# Patient Record
Sex: MALE | Race: WHITE | Employment: OTHER | URBAN - METROPOLITAN AREA
[De-identification: names, ages, dates, MRNs, and addresses within clinical notes are randomized per-mention and may not be internally consistent; named-entity substitution may affect disease eponyms.]

---

## 2018-05-22 PROBLEM — M79.7 FIBROMYALGIA: Status: ACTIVE | Noted: 2017-01-16

## 2020-10-14 PROBLEM — M54.81 BILATERAL OCCIPITAL NEURALGIA: Status: ACTIVE | Noted: 2020-10-14

## 2021-03-08 PROBLEM — M54.50 ACUTE RIGHT-SIDED LOW BACK PAIN WITHOUT SCIATICA: Status: ACTIVE | Noted: 2021-03-08

## 2021-07-20 PROBLEM — G43.111 INTRACTABLE MIGRAINE WITH AURA WITH STATUS MIGRAINOSUS: Status: ACTIVE | Noted: 2021-07-20

## 2021-12-08 ENCOUNTER — TELEPHONE (OUTPATIENT)
Dept: OTHER | Age: 51
End: 2021-12-08

## 2021-12-08 NOTE — TELEPHONE ENCOUNTER
Pt name and  verified. Who is your current or most recent primary care provider and   which hospital were they affiliated with? [facility] [place]    How long did you see them for? forever    Is there a reason that you are looking for a new provider? Establish care    Would you like to provide your e-mail to sign up for out My Chart system? yes  The benefits of being a My Chart member are:    -Request medical appointments  -View your health summary from the My Chart electronic health record. -View test results.  -Request prescription renewals.  -Access trusted health information resources.  -Communicate electronically and securely with your medical care team.    Would you like a test message or letter with your activation code? YES TEXT    Do you have a preference about the provider you will see? Karl Moment    When was your last Annual or Well Child Exam? 2021    Do you have any ongoing or chronic conditions you are currently being treated   for (e.g. Hypertension, Diabetes, Depression, etc)? 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 If so, who? yes    Do you need an appointment to establish care or is there something more   urgent you need to be seen for? Explain: establish    Are you on any medicines that require a special prescription such as a sleeping pills, pain medication or stimulants? (if no go ahead and book appointment, if yes please read the following: no    \"Please be aware that our provider's may not prescribe these medications at the first visit as they need to get to know more about you and your medical problems/history before they will safely prescribe these\" Inforemd? Yes or No yes    Do you have any questions about what we discussed?  No    (If no go ahead and book, if yes please get the details and send to the practice)  Pt is dealing with pain with marijuana      **Thank you for choosing us for your health care needs. Mr. Chelsey Short do want to let you know that you will need to contact your insurance company and make them aware of your new Primary Care Provider which is Audrey Bucio MD.   Also Mr. Michel Livingston a new patient packet will be coming to you. Would you like to have this mailed or faxed to you? MAILED  If faxed what is the number? We would greatly appreciate it if you could complete the release of records and either mail back or drop off at out office within 7 days of receipt. Notes: Pt has had all Immunizations for Covid booster included    Please call pt as he was referred Dr Shelby Jamison at his [address] [facility] office.       Please call pt as his is insisting only wants as advised seeing Dr Margherita Leyden    \  Gabi Ibanez advised on hold for new Patients  CB#: [address] [place] [phone]

## 2021-12-09 NOTE — TELEPHONE ENCOUNTER
I could see him. Probably no new spots until after the new year. He should also no my plans for going part time next fall.

## 2022-05-16 PROBLEM — I25.10 ATHEROSCLEROSIS OF CORONARY ARTERY: Status: ACTIVE | Noted: 2020-07-28

## 2022-05-16 PROBLEM — Q23.1 BICUSPID AORTIC VALVE: Status: ACTIVE | Noted: 2020-07-28

## 2022-05-16 PROBLEM — E88.81 INSULIN RESISTANCE SYNDROME: Status: ACTIVE | Noted: 2021-05-11

## 2022-05-16 PROBLEM — I35.0 AORTIC VALVE STENOSIS: Status: ACTIVE | Noted: 2019-12-18

## 2022-05-16 PROBLEM — I50.22 CHRONIC HFREF (HEART FAILURE WITH REDUCED EJECTION FRACTION) (HCC): Status: ACTIVE | Noted: 2020-08-05

## 2022-05-16 PROBLEM — R74.8 ELEVATED LIVER ENZYMES: Status: ACTIVE | Noted: 2021-05-11

## 2022-05-16 PROBLEM — I48.91 ATRIAL FIBRILLATION (HCC): Status: ACTIVE | Noted: 2020-07-17

## 2022-05-16 PROBLEM — K59.9 FUNCTIONAL BOWEL DISORDER: Status: ACTIVE | Noted: 2018-07-02

## 2022-05-16 PROBLEM — I42.8 NONISCHEMIC CARDIOMYOPATHY (HCC): Status: ACTIVE | Noted: 2020-07-28

## 2022-06-13 PROBLEM — M54.6 ACUTE MIDLINE THORACIC BACK PAIN: Status: ACTIVE | Noted: 2022-06-13

## 2022-07-13 PROBLEM — S22.080A CLOSED COMPRESSION FRACTURE OF THORACOLUMBAR VERTEBRA (HCC): Status: ACTIVE | Noted: 2022-07-13

## 2022-07-13 PROBLEM — S32.010A CLOSED COMPRESSION FRACTURE OF THORACOLUMBAR VERTEBRA (HCC): Status: ACTIVE | Noted: 2022-07-13

## 2022-08-16 PROBLEM — M47.814 THORACIC SPONDYLOSIS: Status: ACTIVE | Noted: 2022-08-16

## 2022-08-16 PROBLEM — M47.816 LUMBAR SPONDYLOSIS: Status: ACTIVE | Noted: 2022-08-16
